# Patient Record
Sex: FEMALE | Race: OTHER
[De-identification: names, ages, dates, MRNs, and addresses within clinical notes are randomized per-mention and may not be internally consistent; named-entity substitution may affect disease eponyms.]

---

## 2023-03-17 ENCOUNTER — HOSPITAL ENCOUNTER (EMERGENCY)
Dept: HOSPITAL 12 - ER | Age: 53
Discharge: LEFT BEFORE DISCHARGE | End: 2023-03-17
Payer: SELF-PAY

## 2023-03-17 VITALS — WEIGHT: 210 LBS | BODY MASS INDEX: 33.75 KG/M2 | HEIGHT: 66 IN

## 2023-03-17 DIAGNOSIS — S09.90XA: ICD-10-CM
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
## 2023-03-17 DIAGNOSIS — Z53.29: ICD-10-CM

## 2023-03-17 DIAGNOSIS — S32.029A: Primary | ICD-10-CM

## 2023-03-17 DIAGNOSIS — S32.019A: ICD-10-CM

## 2023-03-17 DIAGNOSIS — M50.322: ICD-10-CM
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
## 2023-03-17 DIAGNOSIS — R07.9: ICD-10-CM

## 2023-03-17 DIAGNOSIS — M99.51: ICD-10-CM

## 2023-03-17 PROCEDURE — A4663 DIALYSIS BLOOD PRESSURE CUFF: HCPCS

## 2023-03-17 NOTE — NUR
Pt was seen for same issues yesterday. Pt was discharged in stable condition. 
Safety measures in place. Will continue to monitor.
